# Patient Record
Sex: FEMALE | Race: WHITE | NOT HISPANIC OR LATINO | Employment: FULL TIME | URBAN - METROPOLITAN AREA
[De-identification: names, ages, dates, MRNs, and addresses within clinical notes are randomized per-mention and may not be internally consistent; named-entity substitution may affect disease eponyms.]

---

## 2024-09-24 ENCOUNTER — TELEPHONE (OUTPATIENT)
Dept: HEMATOLOGY/ONCOLOGY | Facility: CLINIC | Age: 44
End: 2024-09-24
Payer: COMMERCIAL

## 2024-09-24 DIAGNOSIS — E04.1 THYROID NODULE: Primary | ICD-10-CM

## 2024-09-24 NOTE — NURSING
Spoke with patient regarding referral from Dr. Ingram. Reviewed appt info and location with her. She is agreeable to be seen by Dr. Jin on [facility] and verbalized understanding. He rbiopsy with Dr. Ingram is scheduled for 9/27/24 at [place].     Oncology Navigation   Intake  Cancer Type: Head and Neck  Type of Referral: External  Date of Referral: 09/24/24  Initial Nurse Navigator Contact: 09/24/24  Referral to Initial Contact Timeline (days): 0  First Appointment Available: 10/01/24     Treatment  Current Status: Staging work-up    Surgical Oncologist: Dr. Teresa Jin (H&N surg onc)                          Acuity      Follow Up  No follow-ups on file.

## 2024-10-01 ENCOUNTER — OFFICE VISIT (OUTPATIENT)
Dept: OTOLARYNGOLOGY | Facility: CLINIC | Age: 44
End: 2024-10-01
Payer: COMMERCIAL

## 2024-10-01 VITALS — HEART RATE: 74 BPM | SYSTOLIC BLOOD PRESSURE: 106 MMHG | DIASTOLIC BLOOD PRESSURE: 72 MMHG

## 2024-10-01 DIAGNOSIS — E04.1 THYROID NODULE: Primary | ICD-10-CM

## 2024-10-01 PROBLEM — R01.1 HEART MURMUR: Status: ACTIVE | Noted: 2021-06-21

## 2024-10-01 PROBLEM — E55.9 VITAMIN D DEFICIENCY: Status: ACTIVE | Noted: 2021-06-21

## 2024-10-01 PROBLEM — E78.1 PURE HYPERTRIGLYCERIDEMIA: Status: ACTIVE | Noted: 2021-06-21

## 2024-10-01 PROCEDURE — 99204 OFFICE O/P NEW MOD 45 MIN: CPT | Mod: S$GLB,,, | Performed by: STUDENT IN AN ORGANIZED HEALTH CARE EDUCATION/TRAINING PROGRAM

## 2024-10-01 PROCEDURE — 99999 PR PBB SHADOW E&M-EST. PATIENT-LVL III: CPT | Mod: PBBFAC,,, | Performed by: STUDENT IN AN ORGANIZED HEALTH CARE EDUCATION/TRAINING PROGRAM

## 2024-10-01 NOTE — PROGRESS NOTES
Note to patients: In accordance with the  Century Cures Act, patients are now granted immediate electronic access to their medical records. This note is primarily intended for communication among medical professionals. As a result, it may incorporate medical terminology, abbreviations, or language that could appear blunt or unfamiliar. If you have questions about this document, we encourage you to discuss it with your physician.      [facility]  Head & Neck [facility]    Patient: Ysabel Lua    : 1980    MRN: [id number]  Primary Care Provider: Almaz Bravo MD  Referring Provider/ENT: [place] Ingram MD   Date of Encounter: 10/01/2024    DIAGNOSIS: multinodular thyroid      CC:   Chief Complaint   Patient presents with    Thyroid Nodule       HPI:   Ysabel Lua is a 44 y.o. female who is being seen today for thyroid nodules. She was evaluated by her PCP who noticed an enlarged thyroid. She was referred to Dr. Ingram, who then referred her here. She has had both an ultrasound and an FNA.    Patient has been overall in her usual state of health. She has had a small decrease in her weight that was unintentional.    Patient has several family members with thyroid cancer. Her , who is with her today. has Graves and so they are familiar with thyroid issues.    Patient lives in [place].    ALLERGIES:  Review of patient's allergies indicates:   Allergen Reactions    Codeine          MEDICATIONS:  No current outpatient medications on file.    PAST MEDICAL HISTORY:  Past Medical History:   Diagnosis Date    Fibroid, uterine 2006    Genetic testing 2017    neg        PAST SURGICAL HISTORY:  Past Surgical History:   Procedure Laterality Date    APPENDECTOMY      MYOMECTOMY  2010        FAMILY HISTORY:  Family History   Problem Relation Name Age of Onset    Colon cancer Maternal Grandfather      Thyroid cancer Maternal Grandfather      Breast cancer Paternal Grandmother       Ovarian cancer Paternal Grandmother          PATIENT GENETIC TEST NEG - 2017    Heart disease Paternal Grandfather      Prostate cancer Paternal Grandfather         SOCIAL HISTORY:  Social History     Socioeconomic History    Marital status:    Tobacco Use    Smoking status: Former    Smokeless tobacco: Never   Substance and Sexual Activity    Alcohol use: Yes     Alcohol/week: 1.0 standard drink of alcohol     Types: 1 Glasses of wine per week    Drug use: No    Sexual activity: Yes     Partners: Male     Birth control/protection: None     Social Drivers of Health     Financial Resource Strain: Unknown (7/27/2021)    [place] [facility]    [place] Resource Strain (CARDIA)     Difficulty of Paying Living Expenses: Patient declined   Food Insecurity: Unknown (7/27/2021)    [place] [facility]    Hunger Vital Sign     Worried About Running Out of Food in the Last Year: Patient declined     Ran Out of Food in the Last Year: Patient declined   Transportation Needs: Unknown (7/27/2021)    [place] [facility]    PRAPARE - Transportation     Lack of Transportation (Medical): Patient declined     Lack of Transportation (Non-Medical): Patient declined   Physical Activity: Unknown (7/27/2021)    [place] [facility]    Exercise Vital Sign     Days of Exercise per Week: Patient declined     Minutes of Exercise per Session: Patient declined   Stress: Unknown (7/27/2021)    [place] [facility]    [employer] - Occupational Stress Questionnaire     Feeling of Stress : Patient declined     See above substance history    REVIEW OF SYSTEMS:   Comprehensive review of systems was discussed with the patient.    Answers submitted by the patient for this visit:  Review of  Symptoms Questionnaire  (Submitted on 9/24/2024)  Fatigue (Tiredness)?: Yes  Unexpected weight loss?: Yes  None of these: Yes  None of these : Yes  None of these: Yes  None of these : Yes  None of these: Yes  None of these: Yes  neck pain: Yes  None of these : Yes  None of these: Yes  None of these : Yes  dizziness: Yes  Light-headedness: Yes  None of these: Yes  None of these: Yes  It is positive only for the above complaints.    PHYSICAL EXAMINATION:  Blood pressure 106/72, pulse 74.    Constitutional: Non-toxic appearing.   Psychiatric: Appropriate mood and affect. Cooperative.  Voice: Non-dysphonic, speaking in full sentences.   Neurologic: Cranial nerves grossly intact, no focal deficits.  Head and face: Salivary glands are not enlarged. Face is symmetric. CN VII strength intact.  Skin: No concerning skin lesions.   Eyes: Vision grossly intact, bilateral extraocular movements intact  Ears: Bilateral pinna, mastoid, external ear canal normal. Hearing intact.   Nose: External nose appears normal.   Lips: No ulcers or lesions  Neck: Palpable left thyroid nodule with skin bruising from FNA  Respiratory: Chest expansion symmetric, no audible stridor or stertor. Breathing is unlabored. No active cough.    DATA REVIEWED:     LABORATORY:  TSH 2.56  T4 1.19    PATHOLOGY:  Thyroid FNA 9/27/24  FINAL DIAGNOSIS:  Thyroid, left lower lobe, nodule (3.6 x 2.9 x 2.7 cm), fine-needle aspirate, cytospins and cellblock:  -Benign nodule, [place] II.     IMAGING:  US 9/9/24  FINDINGS: The right thyroid lobe measures 4.6 x 2.0 x 1.8 cm, and the left thyroid lobe measures 5.9 x 3.0 x 3.1 cm. The isthmus measures 0.43 cm in thickness.    Left thyroid nodule (image 430).  Composition: Mixed cystic and solid (1 point).  Echogenicity: Very hypoechoic (3 points).  Shape: Wider-than-tall (0 points).  Margin: Ill-defined (0 points).  Echogenic foci: None or large comet-tail artifacts (0 points).  Size: 3.6 x 2.7 x 2.9 cm  Change in size  (greater than or equal to 20% increase in at least 2 nodular dimensions, with a minimal increase of 2 mm or greater than or equal to 50% or greater increase in volume): Not applicable.  TI-RADS: TR4: Moderately suspicious - Fine-needle aspiration if greater than or equal to 1.5 cm.    Isthmus nodule.  Composition: Solid or almost completely solid (2 points).  Echogenicity: Hypoechoic (2 points).  Shape: Wider-than-tall (0 points).  Margin: Smooth (0 points).  Echogenic foci: Macrocalcifications (1 point).  Size: 0.9 x 0.8 x 0.4 cm cm  Change in size (greater than or equal to 20% increase in at least 2 nodular dimensions, with a minimal increase of 2 mm or greater than or equal to 50% or greater increase in volume): Not applicable.  TI-RADS: TR4: Moderately suspicious - Fine-needle aspiration if greater than or equal to 1.5 cm; follow if greater than or equal to 1 cm at 1, 2, 3, and 5 years with ultrasound.    Left thyroid 0.5 cm hypoechoic nodule not requiring follow-up in size.    IMPRESSION:  1.  Left thyroid nodule. FNA recommended.  2.  Additional smaller thyroid nodules not requiring follow-up.     RADIOLOGY REVIEWED:  I have independently viewed and agree with the above images and reports which demonstrate large thyroid nodules on the left and small isthmus nodule.    ASSESSMENT AND PLAN:  1. Thyroid nodule       Ysabel Lua is a 44 y.o. female with large left thyroid nodule, with biopsy consistent with [place] II, which is benign.    - We discussed how common thyroid nodules are in the general population. However, I explained that the vast majority of thyroid nodules are not malignant and that there are certain steps that we have to take to evaluate thyroid nodules for malignancy. She has had an ultrasound as well as an ultrasound-guided FNA. Because she is euthyroid, there is no need for a radioactive thyroid scan.    I then discussed with the patient the potential outcomes of the fine-needle aspiration:  "malignant, suspicious for malignancy, follicular lesion/suspicious for follicular lesion, atypia of uncertain significance/FLUS, or benign (in addition to nondiagnostic).  Recommendations for treatment are predicated on this result.  In general benign lesions can be followed with intermittent ultrasound.  My recommendation for malignant or suspicious lesions is total thyroidectomy, potentially with radioactive iodine remnant ablation in the setting of malignancy.  Follicular lesions merit thyroid lobectomy or total thyroidectomy, depending on the specific clinical picture or further molecular studies. In lower risk cases or in patients who are disinclined to pursue surgery or are poor surgical candidates, interval ultrasound is also reasonable. Similarly, either repeat ultrasound-guided needle biopsy or surgery is a reasonable course of action for the indeterminate biopsy result, again depending on the specific clinical picture.     The patient is understandably anxious about the lesion given her family history of cancer, but with a large cystic component and a benign biopsy, it is very reasonable to reassess the nodule with an ultrasound in 6 months. This will give her time to think about her options. If the nodule grows or she has symptoms from the size, we can already readdress surgery regardless of biopsy results. If it changes in appearance or becomes more concerning on interval ultrasound, we will potentially rebiopsy or remove.    In case she does want to proceed with surgery in the future, we discussed what surgery would look like. The procedure (unique or total thyroidectomy) takes anywhere from 1-4 hours and is done as an "outpatient." If we remove only half of the thyroid, many people go home the same day, but for the whole thyroid, most have an overnight stay. Sometimes I place a drain. You will start on thyroid hormone replacement right after surgery only if the whole thyroid is removed, although having " half removing increases the risk of needing replacement in the future.     The risks of thyroidectomy include, but are not limited to, infection, bleeding, scarring, failure to achieve the diagnosis, no evidence of cancer, recurrence, collection of blood or tissue fluid requiring drainage, injury to the recurrent laryngeal nerve with resultant temporary or permanent hoarseness (1% permanent risk with up to 10% temporary risk, greater in revision operations), injury to the superior laryngeal nerve with resultant loss of the upper register for singing or challenges with yelling, temporary or permanent hypocalcemia related to injury or devascularization of the parathyroid glands (less than 5% permanent, up to 30-60% when paratracheal dissection is accomplished, again greater in revision operations), and the need for additional procedures or therapies.       For now, we will observe. She will let me know if she changes her mind.    Orders Placed This Encounter    US Thyroid        Patient encouraged to call with any questions, concerns, or new or worsening symptoms.     Follow up 6 months after new ultrasound

## 2024-10-02 ENCOUNTER — DOCUMENTATION ONLY (OUTPATIENT)
Dept: HEMATOLOGY/ONCOLOGY | Facility: CLINIC | Age: 44
End: 2024-10-02
Payer: COMMERCIAL

## 2024-10-02 NOTE — NURSING
Chart reviewed after f/u with Dr. Teresa Jin. Plan is for surveillance of her thyroid with US in 6 months. No cancer navigation needs. Will sign off

## 2024-11-04 ENCOUNTER — TELEPHONE (OUTPATIENT)
Dept: HEMATOLOGY/ONCOLOGY | Facility: CLINIC | Age: 44
End: 2024-11-04
Payer: COMMERCIAL

## 2024-11-04 NOTE — TELEPHONE ENCOUNTER
Pt states that she has decided that she wants surgery with Dr Jin in Dec.  Pt states that she does not need a clinic appt with Dr Jin to discuss surgery.  Will notify Dr Jin and call back pt with surgery schedule.  Pt satisfied with plan.

## 2024-11-07 ENCOUNTER — TELEPHONE (OUTPATIENT)
Dept: HEMATOLOGY/ONCOLOGY | Facility: CLINIC | Age: 44
End: 2024-11-07
Payer: COMMERCIAL

## 2024-11-07 DIAGNOSIS — E04.9 THYROID GOITER: ICD-10-CM

## 2024-11-07 DIAGNOSIS — E04.1 THYROID NODULE: Primary | ICD-10-CM

## 2024-11-07 RX ORDER — LIDOCAINE HYDROCHLORIDE 10 MG/ML
1 INJECTION, SOLUTION EPIDURAL; INFILTRATION; INTRACAUDAL; PERINEURAL ONCE
OUTPATIENT
Start: 2024-11-07 | End: 2024-11-07

## 2024-11-07 RX ORDER — SODIUM CHLORIDE 0.9 % (FLUSH) 0.9 %
10 SYRINGE (ML) INJECTION EVERY 6 HOURS PRN
OUTPATIENT
Start: 2024-11-07

## 2024-11-07 NOTE — TELEPHONE ENCOUNTER
Pt scheduled for surgery with Dr Jin 12/5/24 at [facility].  Called and reviewed pt's PAT, surgery and post op dates, times and locations.  Reviewed pre op instructions and all questions answered to pt's satisfaction.

## 2024-11-18 ENCOUNTER — TELEPHONE (OUTPATIENT)
Dept: HEMATOLOGY/ONCOLOGY | Facility: CLINIC | Age: 44
End: 2024-11-18
Payer: COMMERCIAL

## 2024-11-18 NOTE — TELEPHONE ENCOUNTER
All pt's questions answered to pt's satisfaction.----- Message from [place] sent at 11/18/2024  1:59 PM CST -----  Type: Needs Medical Advice  Who Called:  Patient   Symptoms (please be specific):    How long has patient had these symptoms:    Pharmacy name and phone #:    Best Call Back Number: [phone]  Additional Information: Patient is requesting a call back from [place].

## 2024-11-29 ENCOUNTER — TELEPHONE (OUTPATIENT)
Dept: HEMATOLOGY/ONCOLOGY | Facility: CLINIC | Age: 44
End: 2024-11-29
Payer: COMMERCIAL

## 2025-04-07 ENCOUNTER — HOSPITAL ENCOUNTER (OUTPATIENT)
Dept: RADIOLOGY | Facility: HOSPITAL | Age: 45
Discharge: HOME | End: 2025-04-07
Attending: STUDENT IN AN ORGANIZED HEALTH CARE EDUCATION/TRAINING PROGRAM
Payer: COMMERCIAL

## 2025-04-07 ENCOUNTER — OFFICE VISIT (OUTPATIENT)
Dept: HEMATOLOGY/ONCOLOGY | Facility: CLINIC | Age: 45
End: 2025-04-07
Payer: COMMERCIAL

## 2025-04-07 VITALS
TEMPERATURE: 97 F | HEIGHT: 65 IN | DIASTOLIC BLOOD PRESSURE: 67 MMHG | WEIGHT: 158.06 LBS | HEART RATE: 70 BPM | SYSTOLIC BLOOD PRESSURE: 113 MMHG | RESPIRATION RATE: 16 BRPM | BODY MASS INDEX: 26.33 KG/M2 | OXYGEN SATURATION: 97 %

## 2025-04-07 DIAGNOSIS — E04.1 THYROID NODULE: Primary | ICD-10-CM

## 2025-04-07 DIAGNOSIS — E04.1 THYROID NODULE: ICD-10-CM

## 2025-04-07 PROCEDURE — 99999 PR PBB SHADOW E&M-EST. PATIENT-LVL III: CPT | Mod: PBBFAC,,, | Performed by: NURSE PRACTITIONER

## 2025-04-07 PROCEDURE — 76536 US EXAM OF HEAD AND NECK: CPT | Mod: 26,,, | Performed by: RADIOLOGY

## 2025-04-07 PROCEDURE — 76536 US EXAM OF HEAD AND NECK: CPT | Mod: TC,PO

## 2025-04-07 PROCEDURE — 99214 OFFICE O/P EST MOD 30 MIN: CPT | Mod: S$GLB,,, | Performed by: NURSE PRACTITIONER

## 2025-04-07 NOTE — PROGRESS NOTES
Note to patients: In accordance with the  Century Cures Act, patients are now granted immediate electronic access to their medical records. This note is primarily intended for communication among medical professionals. As a result, it may incorporate medical terminology, abbreviations, or language that could appear blunt or unfamiliar. If you have questions about this document, we encourage you to discuss it with your physician.        [facility]  [facility] & Ne[facility]     Patient: Ysabel Lua                  : 1980                          MRN: [id number]  Primary Care Provider: Almaz Bravo MD  Referring Provider/ENT: [place] Ingram MD   Date of Encounter: 10/01/2024     DIAGNOSIS: multinodular thyroid     CC:       Chief Complaint   Patient presents with    Thyroid Nodule         HPI:   Ysabel Lua is a 44 y.o. female who is being seen today for thyroid nodules. She was evaluated by her PCP who noticed an enlarged thyroid. She was referred to Dr. Ingram, who then referred her here. She has had both an ultrasound and an FNA.     Patient has been overall in her usual state of health. She has had a small decrease in her weight that was unintentional.     Patient has several family members with thyroid cancer. Her , who is with her today. has Graves and so they are familiar with thyroid issues.    Interval HPI: She has no changes since her last visit. She denies any growth in the nodule in her neck. She denies any fever, unintentional weight loss, change in voice, swallowing concerns.     Patient lives in [place].     ALLERGIES:       Review of patient's allergies indicates:   Allergen Reactions    Codeine                      MEDICATIONS:  Current Medications   No current outpatient medications on file.        PAST MEDICAL HISTORY:       Past Medical History:   Diagnosis Date    Fibroid, uterine 2006    Genetic testing 2017     neg                    PAST  SURGICAL HISTORY:        Past Surgical History:   Procedure Laterality Date    APPENDECTOMY        MYOMECTOMY   2010                    FAMILY HISTORY:         Family History   Problem Relation Name Age of Onset    Colon cancer Maternal Grandfather        Thyroid cancer Maternal Grandfather        Breast cancer Paternal Grandmother        Ovarian cancer Paternal Grandmother             PATIENT GENETIC TEST NEG - 2017    Heart disease Paternal Grandfather        Prostate cancer Paternal Grandfather             SOCIAL HISTORY:  Social History            Socioeconomic History    Marital status:    Tobacco Use    Smoking status: Former    Smokeless tobacco: Never   Substance and Sexual Activity    Alcohol use: Yes       Alcohol/week: 1.0 standard drink of alcohol       Types: 1 Glasses of wine per week    Drug use: No    Sexual activity: Yes       Partners: Male       Birth control/protection: None      Social Drivers of Health           Financial Resource Strain: Unknown (7/27/2021)     [place] [facility] [place]     Overall Financial Resource Strain (CARDIA)      Difficulty of Paying Living Expenses: Patient declined   Food Insecurity: Unknown (7/27/2021)     Received from [facility]     Hunger Vital Sign      Worried About Running Out of Food in the Last Year: Patient declined      Ran Out of Food in the Last Year: Patient declined   Transportation Needs: Unknown (7/27/2021)     Received from [facility]     PRAPARE - Transportation      Lack of Transportation (Medical): Patient declined      Lack of Transportation (Non-Medical): Patient declined   Physical Activity: Unknown (7/27/2021)     Received from [facility]     Exercise Vital Sign      Days of Exercise per Week: Patient declined      Minutes of Exercise per Session: Patient declined   Stress:  Unknown (7/27/2021)     [facility]     [employer] - Occupational Stress Questionnaire      Feeling of Stress : Patient declined      See above substance history     REVIEW OF SYSTEMS:   Comprehensive review of systems was discussed with the patient.    Answers submitted by the patient for this visit:  Review of Symptoms Questionnaire  (Submitted on 9/24/2024)  Fatigue (Tiredness)?: Yes  Unexpected weight loss?: Yes  None of these: Yes  None of these : Yes  None of these: Yes  None of these : Yes  None of these: Yes  None of these: Yes  neck pain: Yes  None of these : Yes  None of these: Yes  None of these : Yes  dizziness: Yes  Light-headedness: Yes  None of these: Yes  None of these: Yes  It is positive only for the above complaints.     PHYSICAL EXAMINATION:  Blood pressure 106/72, pulse 74.     Constitutional: Non-toxic appearing.   Psychiatric: Appropriate mood and affect. Cooperative.  Voice: Non-dysphonic, speaking in full sentences.   Neurologic: Cranial nerves grossly intact, no focal deficits.  Head and face: Salivary glands are not enlarged. Face is symmetric. CN VII strength intact.  Skin: No concerning skin lesions.   Eyes: Vision grossly intact, bilateral extraocular movements intact  Ears: Bilateral pinna, mastoid, external ear canal normal. Hearing intact.   Nose: External nose appears normal.   Lips: No ulcers or lesions  Neck: Palpable left thyroid nodule   Respiratory: Chest expansion symmetric, no audible stridor or stertor. Breathing is unlabored. No active cough.     DATA REVIEWED:      LABORATORY:  TSH 2.56  T4 1.19     PATHOLOGY:  Thyroid FNA 9/27/24  FINAL DIAGNOSIS:  Thyroid, left lower lobe, nodule (3.6 x 2.9 x 2.7 cm), fine-needle aspirate, cytospins and cellblock:  -Benign nodule, [place] II.      IMAGING:  US 9/9/24  FINDINGS: The right thyroid lobe measures 4.6 x 2.0 x 1.8 cm, and the left thyroid lobe  measures 5.9 x 3.0 x 3.1 cm. The isthmus measures 0.43 cm in thickness.    Left thyroid nodule (image 430).  Composition: Mixed cystic and solid (1 point).  Echogenicity: Very hypoechoic (3 points).  Shape: Wider-than-tall (0 points).  Margin: Ill-defined (0 points).  Echogenic foci: None or large comet-tail artifacts (0 points).  Size: 3.6 x 2.7 x 2.9 cm  Change in size (greater than or equal to 20% increase in at least 2 nodular dimensions, with a minimal increase of 2 mm or greater than or equal to 50% or greater increase in volume): Not applicable.  TI-RADS: TR4: Moderately suspicious - Fine-needle aspiration if greater than or equal to 1.5 cm.    Isthmus nodule.  Composition: Solid or almost completely solid (2 points).  Echogenicity: Hypoechoic (2 points).  Shape: Wider-than-tall (0 points).  Margin: Smooth (0 points).  Echogenic foci: Macrocalcifications (1 point).  Size: 0.9 x 0.8 x 0.4 cm cm  Change in size (greater than or equal to 20% increase in at least 2 nodular dimensions, with a minimal increase of 2 mm or greater than or equal to 50% or greater increase in volume): Not applicable.  TI-RADS: TR4: Moderately suspicious - Fine-needle aspiration if greater than or equal to 1.5 cm; follow if greater than or equal to 1 cm at 1, 2, 3, and 5 years with ultrasound.    Left thyroid 0.5 cm hypoechoic nodule not requiring follow-up in size.    IMPRESSION:  1.  Left thyroid nodule. FNA recommended.  2.  Additional smaller thyroid nodules not requiring follow-up.     US Thyroid: 4/7/25  1. Interval increase in size of hypoechoic nodule with central calcifications in the right-sided the isthmus.  TI RADS grade of 4.  Considering the change in size, FNA recommended.  2.  Otherwise, stable multinodular goiter.     RADIOLOGY REVIEWED:  I have independently viewed and agree with the above images and reports which demonstrate large thyroid nodules on the left and small isthmus nodule.     ASSESSMENT AND PLAN:  1.  Thyroid nodule       Ysabel Lua is a 44 y.o. female with large left thyroid nodule, with biopsy consistent with [place] II, which is benign.     - Ultrasound is still pending  - If it changes in appearance or becomes more concerning on interval ultrasound, she would opt for removal vs rebiopsy   - Will call to discuss US once resulted    We discussed how common thyroid nodules are in the general population. However, I explained that the vast majority of thyroid nodules are not malignant and that there are certain steps that we have to take to evaluate thyroid nodules for malignancy. She has had an ultrasound, but She now needs an ultrasound-guided FNA, which we have set up through radiology. Because She is euthyroid, there is no need for a radioactive thyroid scan.    I then discussed with the patient the potential outcomes of the fine-needle aspiration: malignant, suspicious for malignancy, follicular lesion/suspicious for follicular lesion, atypia of uncertain significance/FLUS, or benign (in addition to nondiagnostic).  Recommendations for treatment are predicated on this result.  In general benign lesions can be followed with intermittent ultrasound.  My recommendation for malignant or suspicious lesions is total thyroidectomy, potentially with radioactive iodine remnant ablation in the setting of malignancy.  Follicular lesions merit thyroid lobectomy or total thyroidectomy, depending on the specific clinical picture or further molecular studies. In lower risk cases or in patients who are disinclined to pursue surgery or are poor surgical candidates, interval ultrasound is also reasonable. Similarly, either repeat ultrasound-guided needle biopsy or surgery is a reasonable course of action for the indeterminate biopsy result, again depending on the specific clinical picture.  Time was allowed for questions, and all questions were answered to the patient's apparent satisfaction.        Patient encouraged to  call with any questions, concerns, or new or worsening symptoms. 30 minutes spent in direct patient care, chart review and documentation      Follow up to be determined once we have a plan     Will discuss case with Dr. Jin     Addendum: ultrasound reviewed with patient. Will dicuss with Dr. Jin on Thursday to see what surgery would entail (partial/total etc) before deciding on next steps.   4/10/25: Discussed case with Dr. Jin as well as US review. If she does want to proceed with surgery would removed left and isthmus. She verbalized understanding and will call back on Monday. I also offered her an appointment for Monday to discuss surgery with Dr. Jin but she declined at this time,

## 2025-04-17 ENCOUNTER — PATIENT MESSAGE (OUTPATIENT)
Dept: HEMATOLOGY/ONCOLOGY | Facility: CLINIC | Age: 45
End: 2025-04-17
Payer: COMMERCIAL

## 2025-04-21 ENCOUNTER — OFFICE VISIT (OUTPATIENT)
Dept: HEMATOLOGY/ONCOLOGY | Facility: CLINIC | Age: 45
End: 2025-04-21
Payer: COMMERCIAL

## 2025-04-21 VITALS
BODY MASS INDEX: 26 KG/M2 | WEIGHT: 156.06 LBS | OXYGEN SATURATION: 100 % | SYSTOLIC BLOOD PRESSURE: 111 MMHG | DIASTOLIC BLOOD PRESSURE: 71 MMHG | RESPIRATION RATE: 16 BRPM | HEIGHT: 65 IN | HEART RATE: 68 BPM | TEMPERATURE: 98 F

## 2025-04-21 DIAGNOSIS — E04.1 THYROID NODULE: Primary | ICD-10-CM

## 2025-04-21 PROCEDURE — 99999 PR PBB SHADOW E&M-EST. PATIENT-LVL IV: CPT | Mod: PBBFAC,,, | Performed by: STUDENT IN AN ORGANIZED HEALTH CARE EDUCATION/TRAINING PROGRAM

## 2025-04-21 PROCEDURE — 99214 OFFICE O/P EST MOD 30 MIN: CPT | Mod: S$GLB,,, | Performed by: STUDENT IN AN ORGANIZED HEALTH CARE EDUCATION/TRAINING PROGRAM

## 2025-04-21 RX ORDER — SODIUM CHLORIDE 0.9 % (FLUSH) 0.9 %
10 SYRINGE (ML) INJECTION EVERY 6 HOURS PRN
OUTPATIENT
Start: 2025-04-21

## 2025-04-21 RX ORDER — LIDOCAINE HYDROCHLORIDE 10 MG/ML
1 INJECTION, SOLUTION EPIDURAL; INFILTRATION; INTRACAUDAL; PERINEURAL ONCE
OUTPATIENT
Start: 2025-04-21 | End: 2025-04-21

## 2025-04-21 NOTE — PROGRESS NOTES
Note to patients: In accordance with the  Century Cures Act, patients are now granted immediate electronic access to their medical records. This note is primarily intended for communication among medical professionals. As a result, it may incorporate medical terminology, abbreviations, or language that could appear blunt or unfamiliar. If you have questions about this document, we encourage you to discuss it with your physician.        [facility]     Patient: Ysabel Lua                  : 1980                          MRN: [id number]  Primary Care Provider: Almaz Bravo MD  Referring Provider/ENT: [place] Ingram MD   Date of Encounter: 10/01/2024     DIAGNOSIS: multinodular thyroid     CC:       Chief Complaint   Patient presents with    Thyroid Nodule         HPI:   Ysabel Lua is a 44 y.o. female who is being seen today for thyroid nodules. She was evaluated by her PCP who noticed an enlarged thyroid. She was referred to Dr. Ingram, who then referred her here. She has had both an ultrasound and an FNA.     Patient has been overall in her usual state of health. She has had a small decrease in her weight that was unintentional.     Patient has several family members with thyroid cancer. Her , who is with her today. has Graves and so they are familiar with thyroid issues.    25: She has no changes since her last visit. She denies any growth in the nodule in her neck. She denies any fever, unintentional weight loss, change in voice, swallowing concerns.    25: Patient here to discuss surgery.     Patient lives in [place].     ALLERGIES:       Review of patient's allergies indicates:   Allergen Reactions    Codeine                      MEDICATIONS:  Current Medications   No current outpatient medications on file.        PAST MEDICAL HISTORY:       Past Medical History:   Diagnosis Date    Fibroid, uterine 2006    Genetic testing 2017      neg                    PAST SURGICAL HISTORY:        Past Surgical History:   Procedure Laterality Date    APPENDECTOMY        MYOMECTOMY   2010                    FAMILY HISTORY:         Family History   Problem Relation Name Age of Onset    Colon cancer Maternal Grandfather        Thyroid cancer Maternal Grandfather        Breast cancer Paternal Grandmother        Ovarian cancer Paternal Grandmother             PATIENT GENETIC TEST NEG - 2017    Heart disease Paternal Grandfather        Prostate cancer Paternal Grandfather             SOCIAL HISTORY:  Social History            Socioeconomic History    Marital status:    Tobacco Use    Smoking status: Former    Smokeless tobacco: Never   Substance and Sexual Activity    Alcohol use: Yes       Alcohol/week: 1.0 standard drink of alcohol       Types: 1 Glasses of wine per week    Drug use: No    Sexual activity: Yes       Partners: Male       Birth control/protection: None      Social Drivers of Health           Financial Resource Strain: Unknown (7/27/2021)     [place] [facility] [place] Financial Resource Strain (CARDIA)      Difficulty of Paying Living Expenses: Patient declined   Food Insecurity: Unknown (7/27/2021)     [place] [facility]     Hunger Vital Sign      Worried About Running Out of Food in the Last Year: Patient declined      Ran Out of Food in the Last Year: Patient declined   Transportation Needs: Unknown (7/27/2021)     [place] [facility]     PRAPARE - Transportation      Lack of Transportation (Medical): Patient declined      Lack of Transportation (Non-Medical): Patient declined   Physical Activity: Unknown (7/27/2021)     [place] [facility] [place]     Exercise Vital Sign      Days of Exercise per Week: Patient declined      Minutes of Exercise per Session:  Patient declined   Stress: Unknown (7/27/2021)     [facility]     [employer] [place] [employer] - Occupational Stress Questionnaire      Feeling of Stress : Patient declined      See above substance history       PHYSICAL EXAMINATION:     Constitutional: Non-toxic appearing.   Psychiatric: Appropriate mood and affect. Cooperative.  Voice: Non-dysphonic, speaking in full sentences.   Neurologic: Cranial nerves grossly intact, no focal deficits.  Head and face: Salivary glands are not enlarged. Face is symmetric. CN VII strength intact.  Skin: No concerning skin lesions.   Eyes: Vision grossly intact, bilateral extraocular movements intact  Ears: Bilateral pinna, mastoid, external ear canal normal. Hearing intact.   Nose: External nose appears normal.   Lips: No ulcers or lesions  Neck: Palpable left thyroid nodule   Respiratory: Chest expansion symmetric, no audible stridor or stertor. Breathing is unlabored. No active cough.     DATA REVIEWED:      LABORATORY:  TSH 2.56  T4 1.19     PATHOLOGY:  Thyroid FNA 9/27/24  FINAL DIAGNOSIS:  Thyroid, left lower lobe, nodule (3.6 x 2.9 x 2.7 cm), fine-needle aspirate, cytospins and cellblock:  -Benign nodule, [place] II.      IMAGING:  US 9/9/24  FINDINGS: The right thyroid lobe measures 4.6 x 2.0 x 1.8 cm, and the left thyroid lobe measures 5.9 x 3.0 x 3.1 cm. The isthmus measures 0.43 cm in thickness.    Left thyroid nodule (image 430).  Composition: Mixed cystic and solid (1 point).  Echogenicity: Very hypoechoic (3 points).  Shape: Wider-than-tall (0 points).  Margin: Ill-defined (0 points).  Echogenic foci: None or large comet-tail artifacts (0 points).  Size: 3.6 x 2.7 x 2.9 cm  Change in size (greater than or equal to 20% increase in at least 2 nodular dimensions, with a minimal increase of 2 mm or greater than or equal to 50% or greater increase in volume): Not applicable.  TI-RADS: TR4: Moderately  "suspicious - Fine-needle aspiration if greater than or equal to 1.5 cm.    Isthmus nodule.  Composition: Solid or almost completely solid (2 points).  Echogenicity: Hypoechoic (2 points).  Shape: Wider-than-tall (0 points).  Margin: Smooth (0 points).  Echogenic foci: Macrocalcifications (1 point).  Size: 0.9 x 0.8 x 0.4 cm cm  Change in size (greater than or equal to 20% increase in at least 2 nodular dimensions, with a minimal increase of 2 mm or greater than or equal to 50% or greater increase in volume): Not applicable.  TI-RADS: TR4: Moderately suspicious - Fine-needle aspiration if greater than or equal to 1.5 cm; follow if greater than or equal to 1 cm at 1, 2, 3, and 5 years with ultrasound.    Left thyroid 0.5 cm hypoechoic nodule not requiring follow-up in size.    IMPRESSION:  1.  Left thyroid nodule. FNA recommended.  2.  Additional smaller thyroid nodules not requiring follow-up.     US Thyroid: 4/7/25  1. Interval increase in size of hypoechoic nodule with central calcifications in the right-sided the isthmus.  TI RADS grade of 4.  Considering the change in size, FNA recommended.  2.  Otherwise, stable multinodular goiter.     RADIOLOGY REVIEWED:  I have independently viewed and agree with the above images and reports which demonstrate large thyroid nodules on the left and small isthmus nodule.     ASSESSMENT AND PLAN:  1. Thyroid nodule       Ysabel Lua is a 44 y.o. female with large left thyroid nodule as well as additional nodule in the thyroid isthmus.  - Patient would like to proceed with surgical excision given the growth of the nodules. Even though these are very likely benign, close monitoring is causing undue distress.  - We will plan on removing the left thyroid with the large nodule as well as the isthmus with the TIRADS 4 nodule  - The procedure takes anywhere from 1-4 hours and is done as an "outpatient" with an overnight stay. Sometimes I place a drain. There are some situations where " you can go home the same day, but those are honestly rare. After surgery, all that we ask is that you avoid straining, including bending over to pick something up, for 10 days.   -The risks of thyroidectomy include, but are not limited to, infection, bleeding, scarring, failure to achieve the diagnosis, no evidence of cancer, recurrence, collection of blood or tissue fluid requiring drainage, injury to the recurrent laryngeal nerve with resultant temporary or permanent hoarseness (1% permanent risk with up to 10% temporary risk, greater in revision operations), injury to the superior laryngeal nerve with resultant loss of the upper register for singing or challenges with yelling, temporary or permanent hypocalcemia related to injury or devascularization of the parathyroid glands (less than 5% permanent, up to 30-60% when paratracheal dissection is accomplished, again greater in revision operations), and the need for additional procedures or therapies.    - We will tentatively plan for surgery the first week of May    Orders Placed This Encounter    Case Request Operating Room: THYROIDECTOMY, SUBTOTAL        Patient encouraged to reach out for further questions as needed prior to surgery

## 2025-05-07 PROBLEM — E04.1 THYROID NODULE: Status: ACTIVE | Noted: 2025-05-07

## 2025-05-12 ENCOUNTER — RESULTS FOLLOW-UP (OUTPATIENT)
Dept: HEMATOLOGY/ONCOLOGY | Facility: CLINIC | Age: 45
End: 2025-05-12

## 2025-05-12 ENCOUNTER — LAB VISIT (OUTPATIENT)
Dept: LAB | Facility: HOSPITAL | Age: 45
End: 2025-05-12
Attending: OBSTETRICS & GYNECOLOGY
Payer: COMMERCIAL

## 2025-05-12 ENCOUNTER — OFFICE VISIT (OUTPATIENT)
Dept: HEMATOLOGY/ONCOLOGY | Facility: CLINIC | Age: 45
End: 2025-05-12
Payer: COMMERCIAL

## 2025-05-12 VITALS
HEART RATE: 76 BPM | RESPIRATION RATE: 16 BRPM | OXYGEN SATURATION: 99 % | HEIGHT: 65 IN | BODY MASS INDEX: 25.13 KG/M2 | SYSTOLIC BLOOD PRESSURE: 106 MMHG | TEMPERATURE: 98 F | WEIGHT: 150.81 LBS | DIASTOLIC BLOOD PRESSURE: 77 MMHG

## 2025-05-12 DIAGNOSIS — E83.51 HYPOCALCEMIA: ICD-10-CM

## 2025-05-12 DIAGNOSIS — C73 THYROID CANCER: Primary | ICD-10-CM

## 2025-05-12 LAB — CALCIUM SERPL-MCNC: 10 MG/DL (ref 8.7–10.5)

## 2025-05-12 PROCEDURE — 99024 POSTOP FOLLOW-UP VISIT: CPT | Mod: S$GLB,,, | Performed by: NURSE PRACTITIONER

## 2025-05-12 PROCEDURE — 36415 COLL VENOUS BLD VENIPUNCTURE: CPT | Mod: PN

## 2025-05-12 PROCEDURE — 99999 PR PBB SHADOW E&M-EST. PATIENT-LVL IV: CPT | Mod: PBBFAC,,, | Performed by: NURSE PRACTITIONER

## 2025-05-12 PROCEDURE — 82310 ASSAY OF CALCIUM: CPT | Mod: PN

## 2025-05-12 NOTE — PROGRESS NOTES
Note to patients: In accordance with the  Century Cures Act, patients are now granted immediate electronic access to their medical records. This note is primarily intended for communication among medical professionals. As a result, it may incorporate medical terminology, abbreviations, or language that could appear blunt or unfamiliar. If you have questions about this document, we encourage you to discuss it with your physician.        [facility]  [facility]     Patient: Ysabel Lua                  : 1980                          MRN: [id number]  Primary Care Provider: Almaz Bravo MD  Referring Provider/ENT: [place] Ingram MD   Date of Encounter: 10/01/2024     DIAGNOSIS: multinodular thyroid     CC:       Chief Complaint   Patient presents with    Thyroid Nodule         HPI:   Ysabel Lua is a 44 y.o. female who is being seen today for thyroid nodules. She was evaluated by her PCP who noticed an enlarged thyroid. She was referred to Dr. Ingram, who then referred her here. She has had both an ultrasound and an FNA.     Patient has been overall in her usual state of health. She has had a small decrease in her weight that was unintentional.     Patient has several family members with thyroid cancer. Her , who is with her today. has Graves and so they are familiar with thyroid issues.    25: She has no changes since her last visit. She denies any growth in the nodule in her neck. She denies any fever, unintentional weight loss, change in voice, swallowing concerns.    25: Patient here to discuss surgery.    25:  She has had around 20cc in the last 24 hours. She is taking her tums 1000mg TID. She is taking her levothyroxine daily on an empty stomach. Overall she is doing well. She denies any pain today. She was having some pain in her perineum last night and thought she may have a hemorrhoid. She has not had any pain today.      Patient lives  in [place].    TREATMENT:   THYROIDECTOMY - total 5/7/25     ALLERGIES:       Review of patient's allergies indicates:   Allergen Reactions    Codeine                      MEDICATIONS:  Current Medications   No current outpatient medications on file.        PAST MEDICAL HISTORY:       Past Medical History:   Diagnosis Date    Fibroid, uterine 2006    Genetic testing 2017     neg                    PAST SURGICAL HISTORY:        Past Surgical History:   Procedure Laterality Date    APPENDECTOMY        MYOMECTOMY   2010                    FAMILY HISTORY:         Family History   Problem Relation Name Age of Onset    Colon cancer Maternal Grandfather        Thyroid cancer Maternal Grandfather        Breast cancer Paternal Grandmother        Ovarian cancer Paternal Grandmother             PATIENT GENETIC TEST NEG - 2017    Heart disease Paternal Grandfather        Prostate cancer Paternal Grandfather             SOCIAL HISTORY:  Social History            Socioeconomic History    Marital status:    Tobacco Use    Smoking status: Former    Smokeless tobacco: Never   Substance and Sexual Activity    Alcohol use: Yes       Alcohol/week: 1.0 standard drink of alcohol       Types: 1 Glasses of wine per week    Drug use: No    Sexual activity: Yes       Partners: Male       Birth control/protection: None      Social Drivers of Health           Financial Resource Strain: Unknown (7/27/2021)     [place] [facility] [place]     Overall Financial Resource Strain (CARDIA)      Difficulty of Paying Living Expenses: Patient declined   Food Insecurity: Unknown (7/27/2021)     Received from [facility] [place]     Hunger Vital Sign      Worried About Running Out of Food in the Last Year: Patient declined      Ran Out of Food in the Last Year: Patient declined   Transportation Needs: Unknown (7/27/2021)     [facility]     PRAPARE - Transportation      Lack of Transportation (Medical): Patient declined      Lack of Transportation (Non-Medical): Patient declined   Physical Activity: Unknown (7/27/2021)     [place] [facility] [place]     Exercise Vital Sign      Days of Exercise per Week: Patient declined      Minutes of Exercise per Session: Patient declined   Stress: Unknown (7/27/2021)     Received from [facility] [place]     [place] [employer] - Occupational Stress Questionnaire      Feeling of Stress : Patient declined      See above substance history       PHYSICAL EXAMINATION:     Constitutional: Non-toxic appearing.   Psychiatric: Appropriate mood and affect. Cooperative.  Voice: Non-dysphonic, speaking in full sentences.   Neurologic: Cranial nerves grossly intact, no focal deficits.  Head and face: Salivary glands are not enlarged. Face is symmetric. CN VII strength intact.  Skin: Incision c/d/I  Eyes: Vision grossly intact, bilateral extraocular movements intact  Ears: Bilateral pinna, mastoid, external ear canal normal. Hearing intact.   Nose: External nose appears normal.   Lips: No ulcers or lesions  Neck:   Respiratory: Chest expansion symmetric, no audible stridor or stertor. Breathing is unlabored. No active cough.     DATA REVIEWED:      LABORATORY:  TSH 2.56  T4 1.19     PATHOLOGY:  Thyroid FNA 9/27/24  FINAL DIAGNOSIS:  Thyroid, left lower lobe, nodule (3.6 x 2.9 x 2.7 cm), fine-needle aspirate, cytospins and cellblock:  -Benign nodule, [place] II.      IMAGING:  US 9/9/24  FINDINGS: The right thyroid lobe measures 4.6 x 2.0 x 1.8 cm, and the left thyroid lobe measures 5.9 x 3.0 x 3.1 cm. The isthmus measures 0.43 cm in thickness.    Left thyroid nodule (image 430).  Composition: Mixed cystic and solid (1 point).  Echogenicity: Very hypoechoic (3 points).  Shape: Wider-than-tall (0 points).  Margin:  Ill-defined (0 points).  Echogenic foci: None or large comet-tail artifacts (0 points).  Size: 3.6 x 2.7 x 2.9 cm  Change in size (greater than or equal to 20% increase in at least 2 nodular dimensions, with a minimal increase of 2 mm or greater than or equal to 50% or greater increase in volume): Not applicable.  TI-RADS: TR4: Moderately suspicious - Fine-needle aspiration if greater than or equal to 1.5 cm.    Isthmus nodule.  Composition: Solid or almost completely solid (2 points).  Echogenicity: Hypoechoic (2 points).  Shape: Wider-than-tall (0 points).  Margin: Smooth (0 points).  Echogenic foci: Macrocalcifications (1 point).  Size: 0.9 x 0.8 x 0.4 cm cm  Change in size (greater than or equal to 20% increase in at least 2 nodular dimensions, with a minimal increase of 2 mm or greater than or equal to 50% or greater increase in volume): Not applicable.  TI-RADS: TR4: Moderately suspicious - Fine-needle aspiration if greater than or equal to 1.5 cm; follow if greater than or equal to 1 cm at 1, 2, 3, and 5 years with ultrasound.    Left thyroid 0.5 cm hypoechoic nodule not requiring follow-up in size.    IMPRESSION:  1.  Left thyroid nodule. FNA recommended.  2.  Additional smaller thyroid nodules not requiring follow-up.     US Thyroid: 4/7/25  1. Interval increase in size of hypoechoic nodule with central calcifications in the right-sided the isthmus.  TI RADS grade of 4.  Considering the change in size, FNA recommended.  2.  Otherwise, stable multinodular goiter.     RADIOLOGY REVIEWED:  I have independently viewed and agree with the above images and reports which demonstrate large thyroid nodules on the left and small isthmus nodule.     ASSESSMENT AND PLAN:  1. Thyroid nodule       Ysabel Lua is a 44 y.o. female with large left thyroid nodule as well as additional nodule in the thyroid isthmus.  - Path still pending; prelim on frozen did come back as papillary thyroid cancer  - Will place referral to  endo   - Drain removed today  - Can try Anusol, sitz bath, stool softener and f/u with GYN or PCP if does not improve    Orders Placed This Encounter    Calcium    Ambulatory referral/consult to Endocrinology        Patient encouraged to reach out for further questions as needed prior to surgery

## 2025-05-12 NOTE — PATIENT INSTRUCTIONS
After the staples or sutures are removed apply Aquaphor to incision 2-3 times a day to keep the incision moist. Do this until the skin is fully healed, about 1-2 weeks.    Apply SPF 50+ sunscreen to the incision site to promote the best cosmetic result - sun will make the scar darker and more visible.    Two weeks following surgery, you can apply ScarAway or biocorneum to the incision site. You can cut this to size, removed it with bathing, wash it and reuse it. See instructions on package.  Remove ScarAway strip at night and apply Aquaphor and gently massage the wound.   If not ScarAway, you can use Mederma or other silicone strips designed for scar treatment. The brand does not matter.  You can do this indefinitely or until the scar is satisfactory.

## 2025-05-14 ENCOUNTER — OFFICE VISIT (OUTPATIENT)
Dept: HEMATOLOGY/ONCOLOGY | Facility: CLINIC | Age: 45
End: 2025-05-14
Payer: COMMERCIAL

## 2025-05-14 ENCOUNTER — RESULTS FOLLOW-UP (OUTPATIENT)
Dept: HEMATOLOGY/ONCOLOGY | Facility: CLINIC | Age: 45
End: 2025-05-14

## 2025-05-14 DIAGNOSIS — E83.51 HYPOCALCEMIA: Primary | ICD-10-CM

## 2025-05-14 DIAGNOSIS — C73 THYROID CANCER: ICD-10-CM

## 2025-05-14 DIAGNOSIS — C73 PAPILLARY THYROID CARCINOMA: ICD-10-CM

## 2025-05-14 PROCEDURE — 99024 POSTOP FOLLOW-UP VISIT: CPT | Mod: 95,,, | Performed by: NURSE PRACTITIONER

## 2025-05-14 NOTE — PROGRESS NOTES
Note to patients: In accordance with the  Century Cures Act, patients are now granted immediate electronic access to their medical records. This note is primarily intended for communication among medical professionals. As a result, it may incorporate medical terminology, abbreviations, or language that could appear blunt or unfamiliar. If you have questions about this document, we encourage you to discuss it with your physician.        [facility]  Virtual visit     The patient location is: home.  The chief complaint leading to consultation is: Head and neck cancer  Visit type: Virtual visit with synchronous audio and video  Total time spent with patient: 40 mins.  Each patient to whom he or she provides medical services by telemedicine is:  (1) informed of the relationship between the physician and patient and the respective role of any other health care provider with respect to management of the patient; and (2) notified that he or she may decline to receive medical services by telemedicine and may withdraw from such care at any time.      Patient: Ysabel Lua                  : 1980                          MRN: [id number]  Primary Care Provider: Almaz Bravo MD  Referring Provider/ENT: [place] Ingram MD   Date of Encounter: 10/01/2024     DIAGNOSIS: multinodular thyroid     CC:       Chief Complaint   Patient presents with    Thyroid Nodule         HPI:   Ysabel Lua is a 44 y.o. female who is being seen today for thyroid nodules. She was evaluated by her PCP who noticed an enlarged thyroid. She was referred to Dr. Ingram, who then referred her here. She has had both an ultrasound and an FNA.     Patient has been overall in her usual state of health. She has had a small decrease in her weight that was unintentional.     Patient has several family members with thyroid cancer. Her , who is with her today. has Graves and so they are familiar  with thyroid issues.    4/7/25: She has no changes since her last visit. She denies any growth in the nodule in her neck. She denies any fever, unintentional weight loss, change in voice, swallowing concerns.    4/21/25: Patient here to discuss surgery.    5/12/25:  She has had around 20cc in the last 24 hours. She is taking her tums 1000mg TID. She is taking her levothyroxine daily on an empty stomach. Overall she is doing well. She denies any pain today. She was having some pain in her perineum last night and thought she may have a hemorrhoid. She has not had any pain today.     5/14/25: Mr. Lua is here for a visit to review her path. When she got home from our last visit she realized she was taking 2,000IU TID instead of 1,0000. She has since just down to 1000 twice a day.      Patient lives in [place].    TREATMENT:   THYROIDECTOMY - total 5/7/25     ALLERGIES:       Review of patient's allergies indicates:   Allergen Reactions    Codeine                      MEDICATIONS:  Current Medications   No current outpatient medications on file.        PAST MEDICAL HISTORY:       Past Medical History:   Diagnosis Date    Fibroid, uterine 2006    Genetic testing 2017     neg                    PAST SURGICAL HISTORY:        Past Surgical History:   Procedure Laterality Date    APPENDECTOMY        MYOMECTOMY   2010                    FAMILY HISTORY:         Family History   Problem Relation Name Age of Onset    Colon cancer Maternal Grandfather        Thyroid cancer Maternal Grandfather        Breast cancer Paternal Grandmother        Ovarian cancer Paternal Grandmother             PATIENT GENETIC TEST NEG - 2017    Heart disease Paternal Grandfather        Prostate cancer Paternal Grandfather             SOCIAL HISTORY:  Social History            Socioeconomic History    Marital status:    Tobacco Use    Smoking status: Former    Smokeless tobacco: Never   Substance and Sexual Activity    Alcohol use: Yes        Alcohol/week: 1.0 standard drink of alcohol       Types: 1 Glasses of wine per week    Drug use: No    Sexual activity: Yes       Partners: Male       Birth control/protection: None      Social Drivers of Health           Financial Resource Strain: Unknown (7/27/2021)     [place] [facility] [place]     Overall Financial Resource Strain (CARDIA)      Difficulty of Paying Living Expenses: Patient declined   Food Insecurity: Unknown (7/27/2021)     [place] [facility] [place] [facility] [place]     Hunger Vital Sign      Worried About Running Out of Food in the Last Year: Patient declined      Ran Out of Food in the Last Year: Patient declined   Transportation Needs: Unknown (7/27/2021)     [place] [facility] [place] [facility] [place]     PRAPARE - Transportation      Lack of Transportation (Medical): Patient declined      Lack of Transportation (Non-Medical): Patient declined   Physical Activity: Unknown (7/27/2021)     [place] [facility] [place]     Exercise Vital Sign      Days of Exercise per Week: Patient declined      Minutes of Exercise per Session: Patient declined   Stress: Unknown (7/27/2021)     [place] [facility] [place] [facility] [place]     [employer] [place] [employer] - Occupational Stress Questionnaire      Feeling of Stress : Patient declined      See above substance history       PHYSICAL EXAMINATION:     Constitutional: Non-toxic appearing.   Psychiatric: Appropriate mood and affect. Cooperative.  Voice: Non-dysphonic, speaking in full sentences.      DATA REVIEWED:      LABORATORY:  TSH 2.56  T4 1.19     PATHOLOGY:  DIAGNOSIS:   05/13/2025 [facility]/kanwal     1. THYROID GLAND, LEFT LOBE, EXCISION:     --PAPILLARY THYROID CARCINOMA, WITH CLASSIC AND FOLLICULAR FEATURES AND    WITH CYSTIC      CHANGE.     --TUMOR IS MULTIFOCAL.     --LARGEST TUMOR FOCUS MEASURES  4.0 CENTIMETERS IN GREATEST DIMENSION.     --MARGINS ARE NEGATIVE FOR MALIGNANCY.     2. THYROID GLAND, RIGHT ISTHMUS, EXCISION:     --INFILTRATIVE PAPILLARY THYROID CARCINOMA, WITH CLASSIC AND FOLLICULAR    FEATURES.     --TUMOR MEASURES 1.2 CENTIMETERS IN GREATEST DIMENSION.     3. THYROID GLAND, RIGHT LOBE, EXCISION:     --PAPILLARY THYROID CARCINOMA, WITH CLASSIC AND FOLLICULAR FEATURES.     --TUMOR IS MULTIFOCAL.     --LARGEST TUMOR FOCUS MEASURES APPROXIMATELY 0.2 CENTIMETER IN GREATEST    DIMENSION.     --MARGINS ARE NEGATIVE FOR MALIGNANCY.     CASE SUMMARY FOR CARCINOMA OF THYROID GLAND:     PROCEDURE: Total thyroidectomy.   TUMOR FOCALITY: Multifocal.   TUMOR SITE: Left lobe; isthmus; right lobe.   TUMOR SIZE: 4.0 centimeters in greatest dimension (largest tumor focus    [left lobe]).     HISTOLOGIC TUMOR TYPES AND SUBTYPES: Papillary thyroid carcinoma, with    classic and follicular features and with cystic change.   ANGIOINVASION (VASCULAR INVASION): Not identified.   LYMPHATIC INVASION: Not identified.   EXTRATHYROIDAL EXTENSION: Not identified.   MARGIN STATUS: All margins negative for malignancy.   REGIONAL LYMPH NODE STATUS: Not applicable (no regional lymph nodes    submitted or found).   pTNM CLASSIFICATION (AJCC 8TH EDITION): pT2 (m)/pN not assigned (no    nodes submitted or found).   ADDITIONAL FINDINGS: Minimal-to-mild nodular hyperplasia.     Thyroid FNA 9/27/24  FINAL DIAGNOSIS:  Thyroid, left lower lobe, nodule (3.6 x 2.9 x 2.7 cm), fine-needle aspirate, cytospins and cellblock:  -Benign nodule, [place] II.      IMAGING:  US 9/9/24  FINDINGS: The right thyroid lobe measures 4.6 x 2.0 x 1.8 cm, and the left thyroid lobe measures 5.9 x 3.0 x 3.1 cm. The isthmus measures 0.43 cm in thickness.    Left thyroid nodule (image 430).  Composition: Mixed cystic and solid (1 point).  Echogenicity: Very hypoechoic (3 points).  Shape: Wider-than-tall (0 points).  Margin: Ill-defined (0  points).  Echogenic foci: None or large comet-tail artifacts (0 points).  Size: 3.6 x 2.7 x 2.9 cm  Change in size (greater than or equal to 20% increase in at least 2 nodular dimensions, with a minimal increase of 2 mm or greater than or equal to 50% or greater increase in volume): Not applicable.  TI-RADS: TR4: Moderately suspicious - Fine-needle aspiration if greater than or equal to 1.5 cm.    Isthmus nodule.  Composition: Solid or almost completely solid (2 points).  Echogenicity: Hypoechoic (2 points).  Shape: Wider-than-tall (0 points).  Margin: Smooth (0 points).  Echogenic foci: Macrocalcifications (1 point).  Size: 0.9 x 0.8 x 0.4 cm cm  Change in size (greater than or equal to 20% increase in at least 2 nodular dimensions, with a minimal increase of 2 mm or greater than or equal to 50% or greater increase in volume): Not applicable.  TI-RADS: TR4: Moderately suspicious - Fine-needle aspiration if greater than or equal to 1.5 cm; follow if greater than or equal to 1 cm at 1, 2, 3, and 5 years with ultrasound.    Left thyroid 0.5 cm hypoechoic nodule not requiring follow-up in size.    IMPRESSION:  1.  Left thyroid nodule. FNA recommended.  2.  Additional smaller thyroid nodules not requiring follow-up.     US Thyroid: 4/7/25  1. Interval increase in size of hypoechoic nodule with central calcifications in the right-sided the isthmus.  TI RADS grade of 4.  Considering the change in size, FNA recommended.  2.  Otherwise, stable multinodular goiter.     RADIOLOGY REVIEWED:  I have independently viewed and agree with the above images and reports which demonstrate large thyroid nodules on the left and small isthmus nodule.       ASSESSMENT AND PLAN:  1. Hypocalcemia    2. Thyroid cancer    3. Papillary thyroid carcinoma       Ysabel Lua is a 44 y.o. female with large left thyroid nodule as well as additional nodule in the thyroid isthmus.  - Path reviewed today   - Endo referral already placed on Monday   -  Aquaphor to the incision 2-3x a day  - TSH in 5 weeks if she does not have an appointment yet with endo at her next visit  - Continue calcium taper, will repeat calcium on Monday since she was on 2,000TID and dropped down to 1000 BID    Orders Placed This Encounter    CALCIUM   Patient encouraged to reach out for further questions as needed prior to surgery    Follow-up in 1 month or sooner with any concerns    Start time: 5/14/2025  9:03 AM CDT End time: 5/14/2025  9:18 AM CDT      I will START or STAY ON the medications listed below when I get home from the hospital:    acetaminophen 325 mg oral tablet  -- 2 tab(s) by mouth every 6 hours, As needed, Mild Pain   -- Indication: For Pain    traMADol 50 mg oral tablet  -- 1 tab(s) by mouth every 8 hours, As needed, Moderate Pain (4 - 6) MDD:3 tabs  -- Indication: For Pain    rivaroxaban 15 mg oral tablet  -- 1 tab(s) by mouth every 12 hours  -- Check with your doctor before becoming pregnant.  It is very important that you take or use this exactly as directed.  Do not skip doses or discontinue unless directed by your doctor.  Obtain medical advice before taking any non-prescription drugs as some may affect the action of this medication.  Take with food.    -- Indication: For Pulmonary embolism

## 2025-05-19 ENCOUNTER — LAB VISIT (OUTPATIENT)
Dept: LAB | Facility: HOSPITAL | Age: 45
End: 2025-05-19
Attending: NURSE PRACTITIONER
Payer: COMMERCIAL

## 2025-05-19 DIAGNOSIS — C73 THYROID CANCER: ICD-10-CM

## 2025-05-19 DIAGNOSIS — E83.51 HYPOCALCEMIA: ICD-10-CM

## 2025-05-19 LAB — CALCIUM SERPL-MCNC: 9.2 MG/DL (ref 8.7–10.5)

## 2025-05-19 PROCEDURE — 36415 COLL VENOUS BLD VENIPUNCTURE: CPT | Mod: PO

## 2025-05-19 PROCEDURE — 82310 ASSAY OF CALCIUM: CPT

## 2025-05-20 ENCOUNTER — RESULTS FOLLOW-UP (OUTPATIENT)
Dept: OTOLARYNGOLOGY | Facility: CLINIC | Age: 45
End: 2025-05-20
Payer: COMMERCIAL

## 2025-05-28 ENCOUNTER — TELEPHONE (OUTPATIENT)
Dept: FAMILY MEDICINE | Facility: CLINIC | Age: 45
End: 2025-05-28
Payer: COMMERCIAL

## 2025-05-28 NOTE — TELEPHONE ENCOUNTER
----- Message from Yolande sent at 5/28/2025  3:44 PM CDT -----  Type:  Patient Returning Call Who Called:Pt   Who Left Message for Patient:Cherise  Does the patient know what this is regarding?:NA Would the patient rather a call back or a response via [facility]? Call Back  Best Call Back Number:[phone] Additional Information: Pt returning call back from Dr Ray office  please leave message in portal     Please call Back to advise. Thanks!

## 2025-05-29 ENCOUNTER — OFFICE VISIT (OUTPATIENT)
Dept: ENDOCRINOLOGY | Facility: CLINIC | Age: 45
End: 2025-05-29
Payer: COMMERCIAL

## 2025-05-29 VITALS
OXYGEN SATURATION: 98 % | WEIGHT: 154.13 LBS | DIASTOLIC BLOOD PRESSURE: 78 MMHG | RESPIRATION RATE: 16 BRPM | SYSTOLIC BLOOD PRESSURE: 110 MMHG | BODY MASS INDEX: 25.68 KG/M2 | HEIGHT: 65 IN | HEART RATE: 75 BPM | TEMPERATURE: 98 F

## 2025-05-29 DIAGNOSIS — C73 THYROID CANCER: Primary | ICD-10-CM

## 2025-05-29 DIAGNOSIS — C73 PAPILLARY THYROID CARCINOMA: ICD-10-CM

## 2025-05-29 PROCEDURE — 99999 PR PBB SHADOW E&M-EST. PATIENT-LVL IV: CPT | Mod: PBBFAC,,, | Performed by: PHYSICIAN ASSISTANT

## 2025-05-29 PROCEDURE — 99204 OFFICE O/P NEW MOD 45 MIN: CPT | Mod: S$GLB,,, | Performed by: PHYSICIAN ASSISTANT

## 2025-05-29 NOTE — PROGRESS NOTES
CC: Papillary Thyroid Cancer    HPI: Ysabel Lua is a 45 y.o. female here for thyroid cancer along with pending conditions listed in the Visit Diagnosis. New to endocrine. Goiter found on exam with PCP in 8/24 at [place]. Thyroid u/s 9/24 showed a 3.6 cm left thyroid nodule. FNA 9/27 was benign. Repeat u/s 4/25 showed growth of an isthmus nodule from 0.9 cm >1.3 cm. S/p thyroidectomy 5/7/25 with Dr. Jin in ENT.  Diagnosed in 5/13/25. +FHx of thyroid cancer in her older brother, cousin and her gf had MEN. Father had a benign thyroid nodule removed. No hx of neck radiation. On mv, mg, probiotics. No perioral numbness.     On LT4 112 mcg daily.   +fatigue, hair loss, mental fog, palpitations.   No c/d, heat/cold intolerance, tremors.  Cycles are regular.    Thyroid Pathology  1. THYROID GLAND, LEFT LOBE, EXCISION:     --PAPILLARY THYROID CARCINOMA, WITH CLASSIC AND FOLLICULAR FEATURES AND    WITH CYSTIC      CHANGE.   --TUMOR IS MULTIFOCAL.   --LARGEST TUMOR FOCUS MEASURES 4.0 CENTIMETERS IN GREATEST DIMENSION.   --MARGINS ARE NEGATIVE FOR MALIGNANCY.   2. THYROID GLAND, RIGHT ISTHMUS, EXCISION:   --INFILTRATIVE PAPILLARY THYROID CARCINOMA, WITH CLASSIC AND FOLLICULAR    FEATURES.   --TUMOR MEASURES 1.2 CENTIMETERS IN GREATEST DIMENSION.   3. THYROID GLAND, RIGHT LOBE, EXCISION:   --PAPILLARY THYROID CARCINOMA, WITH CLASSIC AND FOLLICULAR FEATURES.   --TUMOR IS MULTIFOCAL.   --LARGEST TUMOR FOCUS MEASURES APPROXIMATELY 0.2 CENTIMETER IN GREATEST    DIMENSION.   --MARGINS ARE NEGATIVE FOR MALIGNANCY.     CASE SUMMARY FOR CARCINOMA OF THYROID GLAND:     PROCEDURE: Total thyroidectomy.   TUMOR FOCALITY: Multifocal.   TUMOR SITE: Left lobe; isthmus; right lobe.   TUMOR SIZE: 4.0 centimeters in greatest dimension (largest tumor focus    [left lobe]).     HISTOLOGIC TUMOR TYPES AND SUBTYPES: Papillary thyroid carcinoma, with    classic and follicular features and with cystic change.   ANGIOINVASION (VASCULAR  "INVASION): Not identified.   LYMPHATIC INVASION: Not identified.   EXTRATHYROIDAL EXTENSION: Not identified.   MARGIN STATUS: All margins negative for malignancy.   REGIONAL LYMPH NODE STATUS: Not applicable (no regional lymph nodes    submitted or found).   pTNM CLASSIFICATION (AJCC 8TH EDITION): pT2 (m)/pN not assigned (no    nodes submitted or found).   ADDITIONAL FINDINGS: Minimal-to-mild nodular hyperplasia.    PMHx, PSHx: reviewed in epic.  Social Hx: no ETOH/tobacco use.     Wt Readings from Last 10 Encounters:   05/29/25 69.9 kg (154 lb 1.6 oz)   05/12/25 68.4 kg (150 lb 12.7 oz)   05/07/25 72.1 kg (159 lb)   04/21/25 70.8 kg (156 lb 1.4 oz)   04/07/25 71.7 kg (158 lb 1.1 oz)   02/19/25 68.5 kg (151 lb)   11/26/24 70.5 kg (155 lb 6.8 oz)   08/15/23 71.7 kg (158 lb)   02/08/22 76.2 kg (168 lb)   02/18/19 78.5 kg (173 lb 1 oz)      ROS:   Constitutional: No recent significant weight change  Eyes: No recent visual changes  Cardiovascular: Denies current anginal symptoms  Respiratory: Denies current respiratory difficulty  Gastrointestinal: Denies recent bowel disturbances  GenitoUrinary - No dysuria  Skin: No new skin rash  Neurologic: No focal neurologic complaints  Musculoskeletal: no joint pain  Endocrine: no polyphagia, polydipsia or polyuria  Remainder ROS negative     /78 (BP Location: Left arm, Patient Position: Sitting)   Pulse 75   Temp 98.2 °F (36.8 °C) (Oral)   Resp 16   Ht 5' 5" (1.651 m)   Wt 69.9 kg (154 lb 1.6 oz)   LMP 04/20/2025 (Approximate)   SpO2 98%   BMI 25.64 kg/m²      Personally reviewed labs below:    No results found for: "TSH", "U3XLSGK", "C6EXSUV", "THYROIDAB", "FREET4"          Chemistry        Component Value Date/Time     04/25/2018 0917    K 4.8 04/25/2018 0917     04/25/2018 0917    CO2 24 04/25/2018 0917    BUN 11 04/25/2018 0917    CREATININE 0.8 04/25/2018 0917    GLU 94 04/25/2018 0917        Component Value Date/Time    CALCIUM 9.2 05/19/2025 1047 "    CALCIUM 8.8 05/08/2025 0755    ALKPHOS 31 (L) 04/25/2018 0917    AST 12 04/25/2018 0917    ALT 13 04/25/2018 0917    BILITOT 0.5 04/25/2018 0917    ESTGFRAFRICA >60.0 04/25/2018 0917    EGFRNONAA >60.0 04/25/2018 0917           Lab Results   Component Value Date    HGBA1C 5.3 09/16/2020      PE:  GENERAL: Well developed, well nourished  NECK: Supple neck, normal thyroid. No bruit  LYMPHATIC: No cervical or supraclavicular lymphadenopathy  CARDIOVASCULAR: Normal heart sounds, no pedal edema  RESPIRATORY: Normal effort, clear to auscultation  MUSC: 2+ DTR UE/LE  NEURO: steady gait, CN ll-Xll grossly intact  PSYCH: normal mood and affect    U/s 9/24  FINDINGS: The right thyroid lobe measures 4.6 x 2.0 x 1.8 cm, and the left thyroid lobe measures 5.9 x 3.0 x 3.1 cm. The isthmus measures 0.43 cm in thickness.     Left thyroid nodule (image 430).   Composition: Mixed cystic and solid (1 point).   Echogenicity: Very hypoechoic (3 points).   Shape: Wider-than-tall (0 points).   Margin: Ill-defined (0 points).   Echogenic foci: None or large comet-tail artifacts (0 points).   Size: 3.6 x 2.7 x 2.9 cm   Change in size (greater than or equal to 20% increase in at least 2 nodular dimensions, with a minimal increase of 2 mm or greater than or equal to 50% or greater increase in volume): Not applicable.   TI-RADS: TR4: Moderately suspicious - Fine-needle aspiration if greater than or equal to 1.5 cm.     Isthmus nodule.   Composition: Solid or almost completely solid (2 points).   Echogenicity: Hypoechoic (2 points).   Shape: Wider-than-tall (0 points).   Margin: Smooth (0 points).   Echogenic foci: Macrocalcifications (1 point).   Size: 0.9 x 0.8 x 0.4 cm cm   Change in size (greater than or equal to 20% increase in at least 2 nodular dimensions, with a minimal increase of 2 mm or greater than or equal to 50% or greater increase in volume): Not applicable.   TI-RADS: TR4: Moderately suspicious - Fine-needle aspiration if  greater than or equal to 1.5 cm; follow if greater than or equal to 1 cm at 1, 2, 3, and 5 years with ultrasound.     Left thyroid 0.5 cm hypoechoic nodule not requiring follow-up in size.     IMPRESSION:    1.  Left thyroid nodule. FNA recommended.    2.  Additional smaller thyroid nodules not requiring follow-up.     Assessment/Plan:   1. Thyroid cancer  TSH    T4, Free    Thyroglobulin    TSH    T4, Free    Thyroglobulin      2. Papillary thyroid carcinoma           PTC  4 cm left, 1.2 cm in isthmus  >1 cm. Referral for [facility].   Fhx of thyroid cancer.  Obtain wbs ~3 mths after [facility].   Repeat u/s for 10 years.    Hypothyroidism-  Continue LT4 112 mcg qd.  Recheck in two weeks.    FOLLOWUP  TFTs, Tg, calcitonin in two weeks  F/u in 4 mths-tfts, Tg

## 2025-06-06 ENCOUNTER — PATIENT MESSAGE (OUTPATIENT)
Dept: ENDOCRINOLOGY | Facility: CLINIC | Age: 45
End: 2025-06-06
Payer: COMMERCIAL

## 2025-06-11 ENCOUNTER — OFFICE VISIT (OUTPATIENT)
Dept: HEMATOLOGY/ONCOLOGY | Facility: CLINIC | Age: 45
End: 2025-06-11
Payer: COMMERCIAL

## 2025-06-11 VITALS
SYSTOLIC BLOOD PRESSURE: 100 MMHG | OXYGEN SATURATION: 99 % | HEART RATE: 70 BPM | RESPIRATION RATE: 16 BRPM | TEMPERATURE: 98 F | HEIGHT: 65 IN | WEIGHT: 155.88 LBS | DIASTOLIC BLOOD PRESSURE: 67 MMHG | BODY MASS INDEX: 25.97 KG/M2

## 2025-06-11 DIAGNOSIS — C73 PAPILLARY THYROID CARCINOMA: Primary | ICD-10-CM

## 2025-06-11 PROCEDURE — 99024 POSTOP FOLLOW-UP VISIT: CPT | Mod: S$GLB,,, | Performed by: NURSE PRACTITIONER

## 2025-06-11 PROCEDURE — 99999 PR PBB SHADOW E&M-EST. PATIENT-LVL III: CPT | Mod: PBBFAC,,, | Performed by: NURSE PRACTITIONER

## 2025-06-11 NOTE — PROGRESS NOTES
Note to patients: In accordance with the  Century Cures Act, patients are now granted immediate electronic access to their medical records. This note is primarily intended for communication among medical professionals. As a result, it may incorporate medical terminology, abbreviations, or language that could appear blunt or unfamiliar. If you have questions about this document, we encourage you to discuss it with your physician.        [facility]     Patient: Ysabel Lua                  : 1980                          MRN: [id number]  Primary Care Provider: Almaz Bravo MD  Referring Provider/ENT: [place] Ingram MD   Date of Encounter: 10/01/2024     DIAGNOSIS: multinodular thyroid     CC:       Chief Complaint   Patient presents with    Thyroid Nodule         HPI:   Ysabel Lua is a 44 y.o. female who is being seen today for thyroid nodules. She was evaluated by her PCP who noticed an enlarged thyroid. She was referred to Dr. Ingram, who then referred her here. She has had both an ultrasound and an FNA.     Patient has been overall in her usual state of health. She has had a small decrease in her weight that was unintentional.     Patient has several family members with thyroid cancer. Her , who is with her today. has Graves and so they are familiar with thyroid issues.    25: She has no changes since her last visit. She denies any growth in the nodule in her neck. She denies any fever, unintentional weight loss, change in voice, swallowing concerns.    25: Patient here to discuss surgery.    25:  She has had around 20cc in the last 24 hours. She is taking her tums 1000mg TID. She is taking her levothyroxine daily on an empty stomach. Overall she is doing well. She denies any pain today. She was having some pain in her perineum last night and thought she may have a hemorrhoid. She has not had any pain today.      25:   "Stoney is here for a visit to review her path. When she got home from our last visit she realized she was taking 2,000IU TID instead of 1,0000. She has since just down to 1000 twice a day.   Patient lives in [place].    6/11/25: Ms. Lua presents today for follow-up after thyroid surgery with complaints of fatigue She reports fatigue and increased appetite described as "excessive" since thyroidectomy 5 weeks ago. She experiences intermittent sensation of throat fullness and changes in her singing voice. She gets winded when talking but not during exercise. She previously experienced red lines on her skin when exposed to heat or outdoors, which has resolved in the past two weeks. She did see endo and will have labs tomorrow. She is also working to get scheduled fro [place] with Dr. Rabago. She is taking her Synthroid daily on an empty stomach.       ROS:  ROS as indicated in HPI.          TREATMENT:   THYROIDECTOMY - total 5/7/25     ALLERGIES:       Review of patient's allergies indicates:   Allergen Reactions    Codeine                      MEDICATIONS:  Current Medications   No current outpatient medications on file.        PAST MEDICAL HISTORY:       Past Medical History:   Diagnosis Date    Fibroid, uterine 2006    Genetic testing 2017     neg                    PAST SURGICAL HISTORY:        Past Surgical History:   Procedure Laterality Date    APPENDECTOMY        MYOMECTOMY   2010                    FAMILY HISTORY:         Family History   Problem Relation Name Age of Onset    Colon cancer Maternal Grandfather        Thyroid cancer Maternal Grandfather        Breast cancer Paternal Grandmother        Ovarian cancer Paternal Grandmother             PATIENT GENETIC TEST NEG - 2017    Heart disease Paternal Grandfather        Prostate cancer Paternal Grandfather             SOCIAL HISTORY:  Social History            Socioeconomic History    Marital status:    Tobacco Use    Smoking status: Former    " Smokeless tobacco: Never   Substance and Sexual Activity    Alcohol use: Yes       Alcohol/week: 1.0 standard drink of alcohol       Types: 1 Glasses of wine per week    Drug use: No    Sexual activity: Yes       Partners: Male       Birth control/protection: None      Social Drivers of Health           Financial Resource Strain: Unknown (7/27/2021)     [place] [facility]     Overall Financial Resource Strain (CARDIA)      Difficulty of Paying Living Expenses: Patient declined   Food Insecurity: Unknown (7/27/2021)     [place] [facility]     Hunger Vital Sign      Worried About Running Out of Food in the Last Year: Patient declined      Ran Out of Food in the Last Year: Patient declined   Transportation Needs: Unknown (7/27/2021)     [place] [facility]     PRAPARE - Transportation      Lack of Transportation (Medical): Patient declined      Lack of Transportation (Non-Medical): Patient declined   Physical Activity: Unknown (7/27/2021)     [place] [facility]     Exercise Vital Sign      Days of Exercise per Week: Patient declined      Minutes of Exercise per Session: Patient declined   Stress: Unknown (7/27/2021)     [place] [facility]     [employer] [place] [employer] - Occupational Stress Questionnaire      Feeling of Stress : Patient declined      See above substance history       PHYSICAL EXAMINATION:     Constitutional: Non-toxic appearing.   Psychiatric: Appropriate mood and affect. Cooperative.  Voice: Non-dysphonic, speaking in full sentences.   Neurologic: Cranial nerves grossly intact, no focal deficits.  Head and face: Salivary glands are not enlarged. Face is symmetric. CN VII strength intact.  Skin: Incision healed  Eyes: Vision grossly intact, bilateral extraocular  movements intact  Ears: Bilateral pinna, mastoid, external ear canal normal. Hearing intact.   Nose: External nose appears normal.   Lips: No ulcers or lesions  Neck: scar, no lymphadenopathy or mass felt  Respiratory: Chest expansion symmetric, no audible stridor or stertor. Breathing is unlabored. No active cough.     DATA REVIEWED:      LABORATORY:  TSH 2.56  T4 1.19     PATHOLOGY:  Thyroid FNA 9/27/24  FINAL DIAGNOSIS:  Thyroid, left lower lobe, nodule (3.6 x 2.9 x 2.7 cm), fine-needle aspirate, cytospins and cellblock:  -Benign nodule, Sharon II.      IMAGING:  US 9/9/24  FINDINGS: The right thyroid lobe measures 4.6 x 2.0 x 1.8 cm, and the left thyroid lobe measures 5.9 x 3.0 x 3.1 cm. The isthmus measures 0.43 cm in thickness.    Left thyroid nodule (image 430).  Composition: Mixed cystic and solid (1 point).  Echogenicity: Very hypoechoic (3 points).  Shape: Wider-than-tall (0 points).  Margin: Ill-defined (0 points).  Echogenic foci: None or large comet-tail artifacts (0 points).  Size: 3.6 x 2.7 x 2.9 cm  Change in size (greater than or equal to 20% increase in at least 2 nodular dimensions, with a minimal increase of 2 mm or greater than or equal to 50% or greater increase in volume): Not applicable.  TI-RADS: TR4: Moderately suspicious - Fine-needle aspiration if greater than or equal to 1.5 cm.    Isthmus nodule.  Composition: Solid or almost completely solid (2 points).  Echogenicity: Hypoechoic (2 points).  Shape: Wider-than-tall (0 points).  Margin: Smooth (0 points).  Echogenic foci: Macrocalcifications (1 point).  Size: 0.9 x 0.8 x 0.4 cm cm  Change in size (greater than or equal to 20% increase in at least 2 nodular dimensions, with a minimal increase of 2 mm or greater than or equal to 50% or greater increase in volume): Not applicable.  TI-RADS: TR4: Moderately suspicious - Fine-needle aspiration if greater than or equal to 1.5 cm; follow if greater than or equal to 1 cm at 1, 2, 3, and 5  years with ultrasound.    Left thyroid 0.5 cm hypoechoic nodule not requiring follow-up in size.    IMPRESSION:  1.  Left thyroid nodule. FNA recommended.  2.  Additional smaller thyroid nodules not requiring follow-up.     US Thyroid: 4/7/25  1. Interval increase in size of hypoechoic nodule with central calcifications in the right-sided the isthmus.  TI RADS grade of 4.  Considering the change in size, FNA recommended.  2.  Otherwise, stable multinodular goiter.     RADIOLOGY REVIEWED:  I have independently viewed and agree with the above images and reports which demonstrate large thyroid nodules on the left and small isthmus nodule.     ASSESSMENT AND PLAN:  1. Papillary thyroid carcinoma       Ysabel Lua is a 44 y.o. female S/P total thyroidectomy-   - Endo appointment with labs in September already scheduled  - Continue levothyroxine  - Check TSH next week  - Is scheduling [facility] with Dr. Rabago      Patient encouraged to reach out for further questions as needed prior to surgery    Follow-up in 3 months

## 2025-06-12 ENCOUNTER — LAB VISIT (OUTPATIENT)
Dept: LAB | Facility: HOSPITAL | Age: 45
End: 2025-06-12
Attending: PHYSICIAN ASSISTANT
Payer: COMMERCIAL

## 2025-06-12 DIAGNOSIS — C73 THYROID CANCER: ICD-10-CM

## 2025-06-12 LAB
T4 FREE SERPL-MCNC: 1.24 NG/DL (ref 0.71–1.51)
TSH SERPL-ACNC: 8.71 UIU/ML (ref 0.4–4)

## 2025-06-12 PROCEDURE — 86800 THYROGLOBULIN ANTIBODY: CPT

## 2025-06-12 PROCEDURE — 84443 ASSAY THYROID STIM HORMONE: CPT

## 2025-06-12 PROCEDURE — 82308 ASSAY OF CALCITONIN: CPT

## 2025-06-12 PROCEDURE — 84439 ASSAY OF FREE THYROXINE: CPT

## 2025-06-12 PROCEDURE — 36415 COLL VENOUS BLD VENIPUNCTURE: CPT | Mod: PO

## 2025-06-14 LAB
CALCIT SERPL-MCNC: <5 PG/ML
ENDOCRINOLOGIST REVIEW: ABNORMAL
THYROGLOB AB SERPL IA-ACNC: 1.8 IU/ML
THYROGLOB SERPL-MCNC: <0.1 NG/ML

## 2025-06-20 ENCOUNTER — PATIENT MESSAGE (OUTPATIENT)
Dept: ENDOCRINOLOGY | Facility: CLINIC | Age: 45
End: 2025-06-20
Payer: COMMERCIAL

## 2025-06-20 ENCOUNTER — RESULTS FOLLOW-UP (OUTPATIENT)
Dept: ENDOCRINOLOGY | Facility: CLINIC | Age: 45
End: 2025-06-20

## 2025-06-20 DIAGNOSIS — E03.9 HYPOTHYROIDISM, UNSPECIFIED TYPE: Primary | ICD-10-CM

## 2025-06-20 RX ORDER — LEVOTHYROXINE SODIUM 125 UG/1
125 TABLET ORAL
Qty: 30 TABLET | Refills: 11 | Status: SHIPPED | OUTPATIENT
Start: 2025-06-20 | End: 2025-08-03

## 2025-06-23 ENCOUNTER — TELEPHONE (OUTPATIENT)
Dept: ENDOCRINOLOGY | Facility: CLINIC | Age: 45
End: 2025-06-23

## 2025-06-23 ENCOUNTER — OFFICE VISIT (OUTPATIENT)
Dept: ENDOCRINOLOGY | Facility: CLINIC | Age: 45
End: 2025-06-23
Payer: COMMERCIAL

## 2025-06-23 ENCOUNTER — HOSPITAL ENCOUNTER (OUTPATIENT)
Dept: ENDOCRINOLOGY | Facility: CLINIC | Age: 45
Discharge: HOME | End: 2025-06-23
Attending: INTERNAL MEDICINE
Payer: COMMERCIAL

## 2025-06-23 ENCOUNTER — RESULTS FOLLOW-UP (OUTPATIENT)
Dept: ENDOCRINOLOGY | Facility: CLINIC | Age: 45
End: 2025-06-23

## 2025-06-23 VITALS
BODY MASS INDEX: 26.67 KG/M2 | SYSTOLIC BLOOD PRESSURE: 105 MMHG | DIASTOLIC BLOOD PRESSURE: 69 MMHG | HEART RATE: 71 BPM | WEIGHT: 160.25 LBS

## 2025-06-23 DIAGNOSIS — E89.0 POSTOPERATIVE HYPOTHYROIDISM: ICD-10-CM

## 2025-06-23 DIAGNOSIS — C73 THYROID CANCER: Primary | ICD-10-CM

## 2025-06-23 DIAGNOSIS — C73 PAPILLARY THYROID CARCINOMA: ICD-10-CM

## 2025-06-23 DIAGNOSIS — C73 THYROID CANCER: ICD-10-CM

## 2025-06-23 PROBLEM — E04.1 THYROID NODULE: Status: RESOLVED | Noted: 2025-05-07 | Resolved: 2025-06-23

## 2025-06-23 PROCEDURE — 76536 US EXAM OF HEAD AND NECK: CPT | Mod: S$GLB,,, | Performed by: INTERNAL MEDICINE

## 2025-06-23 PROCEDURE — G2211 COMPLEX E/M VISIT ADD ON: HCPCS | Mod: S$GLB,,, | Performed by: INTERNAL MEDICINE

## 2025-06-23 PROCEDURE — 99214 OFFICE O/P EST MOD 30 MIN: CPT | Mod: S$GLB,,, | Performed by: INTERNAL MEDICINE

## 2025-06-23 PROCEDURE — 99999 PR PBB SHADOW E&M-EST. PATIENT-LVL III: CPT | Mod: PBBFAC,,, | Performed by: INTERNAL MEDICINE

## 2025-06-23 RX ORDER — ONDANSETRON 4 MG/1
4 TABLET, ORALLY DISINTEGRATING ORAL EVERY 6 HOURS PRN
Qty: 20 TABLET | Refills: 0 | Status: SHIPPED | OUTPATIENT
Start: 2025-06-23

## 2025-06-23 NOTE — PROGRESS NOTES
FOLLOW-UP VISIT    Subjective:      Chief Complaint: Follow-up visit for thyroid cancer and post-surgical hypothyroidism    HPI: Ysabel Lua is a 45 y.o. female with    Past Medical History:   Diagnosis Date    Fibroid, uterine 2006    Genetic testing 2017    neg    Thyroid nodule        Patient is new to me and last seen by Blanca Ray on 25.      With regards to thyroid cancer:    Pt is here for follow up of differentiated thyroid cancer and to discuss treatment with I-131.    Thyroid cancer diagnosed 2025  Total thyroidectomy 2025 with Dr. Jin  Pathology:        PASCUAL Risk assessment: Low    Last T.8 with TSH 8.7 (6 weeks post-op)  Last U/S: 25 - CHELSY    Prior I-131:  None     Lab Results   Component Value Date    TSH 8.706 (H) 2025    FREET4 1.24 2025     LT4 dose increased from 112 > 125 mcg daily on . Repeat labs scheduled 25.          Objective:     Vitals:    25 0919   BP: 105/69   Pulse: 71         BP Readings from Last 5 Encounters:   25 105/69   25 100/67   25 110/78   25 106/77   25 126/72         Physical Exam  Vitals and nursing note reviewed.   Constitutional:       General: She is not in acute distress.     Appearance: She is well-developed. She is not ill-appearing.   HENT:      Head: Normocephalic and atraumatic.   Neck:      Thyroid: No thyromegaly (scar healing well).      Trachea: No tracheal deviation.   Pulmonary:      Effort: Pulmonary effort is normal. No respiratory distress.   Neurological:      Mental Status: She is alert and oriented to person, place, and time.      Coordination: Coordination normal.   Psychiatric:         Mood and Affect: Mood normal.         Behavior: Behavior normal.           Wt Readings from Last 3 Encounters:   25 0919 72.7 kg (160 lb 4.4 oz)   25 0909 70.7 kg (155 lb 13.8 oz)   25 1245 69.9 kg (154 lb 1.6 oz)         Assessment/Plan:       Papillary thyroid  carcinoma    I-131:    Informed consent for treatment was obtained verbally.  The indication, risks and benefits and alternatives to treatment with radioactive iodine were reviewed with the patient.  Specifically, the risks for nausea and vomiting, temporary or permanent injury to the salivary glands, and secondary malignancies such as leukemia were discussed.  All questions were answered, and the patient would like to proceed with treatment.     The importance of preparation with the low iodine diet was reviewed, and the principles of radiation safety precaution were also reviewed.      The patient has no special medical needs. Home and travel situations are appropriate for outpatient treatment.     Given the status/extent of the disease, an administered activity of 75 mCi of I-131 would be appropriate.  Written radiation safety precautions will be provided, and the informed consent form can be signed on the date of treatment.     A working schedule is as follows:    7/2 (Wednesday):  Begin low iodine diet  7/14 (Monday):  Receive 1st Thyrogen injection at [facility] ([facility], 6th floor clinic [place])  7/15 (Tuesday):  Receive 2nd Thyrogen injection at [facility] followed by labs  7/16 (Wednesday):   Treatment with 75 mCi I-131 at [facility]  7/19 (Saturday):   Resume usual diet  7/23 (Wednesday):   Undergo post-treatment whole body scan at Nuclear Medicine      Checklist:  [x]    Written Directive - to be completed when patient arrives for treatment  [x]    Screening worksheet for outpatient treatment reviewed and signed  [x]    Patient radiation safety instructions provided  [x]    Record of patient release (<220 mCi for thyroidectomy patients) - to be completed when patient arrives for treatment  [x]    Informed consent - to be signed when patient arrives for treatment  [x]    Pregnancy and breast feeding screen (order serum hCG within 48 hours of treatment if female <55 years  old and without hysterectomy)  [x]    Low iodine diet information provided

## 2025-06-23 NOTE — ASSESSMENT & PLAN NOTE
I-131:    Informed consent for treatment was obtained verbally.  The indication, risks and benefits and alternatives to treatment with radioactive iodine were reviewed with the patient.  Specifically, the risks for nausea and vomiting, temporary or permanent injury to the salivary glands, and secondary malignancies such as leukemia were discussed.  All questions were answered, and the patient would like to proceed with treatment.     The importance of preparation with the low iodine diet was reviewed, and the principles of radiation safety precaution were also reviewed.      The patient has no special medical needs. Home and travel situations are appropriate for outpatient treatment.     Given the status/extent of the disease, an administered activity of 75 mCi of I-131 would be appropriate.  Written radiation safety precautions will be provided, and the informed consent form can be signed on the date of treatment.     A working schedule is as follows:    7/2 (Wednesday):  Begin low iodine diet  7/14 (Monday):  Receive 1st Thyrogen injection at [facility] ([facility], 6th floor clinic [place])  7/15 (Tuesday):  Receive 2nd Thyrogen injection at [facility] followed by labs  7/16 (Wednesday):   Treatment with 75 mCi I-131 at [facility]  7/19 (Saturday):   Resume usual diet  7/23 (Wednesday):   Undergo post-treatment whole body scan at Nuclear Medicine      Checklist:  [x]    Written Directive - to be completed when patient arrives for treatment  [x]    Screening worksheet for outpatient treatment reviewed and signed  [x]    Patient radiation safety instructions provided  [x]    Record of patient release (<220 mCi for thyroidectomy patients) - to be completed when patient arrives for treatment  [x]    Informed consent - to be signed when patient arrives for treatment  [x]    Pregnancy and breast feeding screen (order serum hCG within 48 hours of treatment if female <55 years old and  without hysterectomy)  [x]    Low iodine diet information provided

## 2025-06-23 NOTE — TELEPHONE ENCOUNTER
7/2 (Wednesday):  Begin low iodine diet  7/14 (Monday):  Receive 1st Thyrogen injection at Endocrine clinic in [place]  7/15 (Tuesday):  Receive 2nd Thyrogen injection at Endocrine clinic in [place] followed by labs in [place]  7/16 (Wednesday):   Treatment with 75 mCi I-131 at [facility]   7/23 (Wednesday):   Undergo post-treatment whole body scan at Nuclear Medicine

## 2025-07-14 ENCOUNTER — CLINICAL SUPPORT (OUTPATIENT)
Dept: ENDOCRINOLOGY | Facility: CLINIC | Age: 45
End: 2025-07-14
Payer: COMMERCIAL

## 2025-07-14 ENCOUNTER — PATIENT MESSAGE (OUTPATIENT)
Dept: ENDOCRINOLOGY | Facility: CLINIC | Age: 45
End: 2025-07-14
Payer: COMMERCIAL

## 2025-07-14 DIAGNOSIS — C73 PAPILLARY THYROID CARCINOMA: Primary | ICD-10-CM

## 2025-07-14 PROCEDURE — 96372 THER/PROPH/DIAG INJ SC/IM: CPT | Mod: S$GLB,,, | Performed by: INTERNAL MEDICINE

## 2025-07-15 ENCOUNTER — CLINICAL SUPPORT (OUTPATIENT)
Dept: ENDOCRINOLOGY | Facility: CLINIC | Age: 45
End: 2025-07-15
Payer: COMMERCIAL

## 2025-07-15 ENCOUNTER — LAB VISIT (OUTPATIENT)
Dept: LAB | Facility: HOSPITAL | Age: 45
End: 2025-07-15
Attending: INTERNAL MEDICINE
Payer: COMMERCIAL

## 2025-07-15 DIAGNOSIS — C73 THYROID CANCER: ICD-10-CM

## 2025-07-15 DIAGNOSIS — C73 PAPILLARY THYROID CARCINOMA: Primary | ICD-10-CM

## 2025-07-15 LAB
HCG INTACT+B SERPL-ACNC: <2.4 MIU/ML
T4 FREE SERPL-MCNC: 1.35 NG/DL (ref 0.71–1.51)
TSH SERPL-ACNC: 107.55 UIU/ML (ref 0.4–4)

## 2025-07-15 PROCEDURE — 84439 ASSAY OF FREE THYROXINE: CPT

## 2025-07-15 PROCEDURE — 86800 THYROGLOBULIN ANTIBODY: CPT

## 2025-07-15 PROCEDURE — 36415 COLL VENOUS BLD VENIPUNCTURE: CPT | Mod: PO

## 2025-07-15 PROCEDURE — 84702 CHORIONIC GONADOTROPIN TEST: CPT

## 2025-07-15 PROCEDURE — 96372 THER/PROPH/DIAG INJ SC/IM: CPT | Mod: S$GLB,,, | Performed by: INTERNAL MEDICINE

## 2025-07-15 PROCEDURE — 84443 ASSAY THYROID STIM HORMONE: CPT

## 2025-07-15 NOTE — PROGRESS NOTES
Administered Thyrogen ( 2nd dose ) to patient to Left Deltoid, Pt tolerated well.  Pt to have labs drawn after injection.

## 2025-07-16 ENCOUNTER — HOSPITAL ENCOUNTER (OUTPATIENT)
Dept: RADIOLOGY | Facility: HOSPITAL | Age: 45
Discharge: HOME | End: 2025-07-16
Attending: INTERNAL MEDICINE
Payer: COMMERCIAL

## 2025-07-16 DIAGNOSIS — C73 THYROID CANCER: ICD-10-CM

## 2025-07-16 PROCEDURE — 79005 NUCLEAR RX ORAL ADMIN: CPT | Mod: TC

## 2025-07-16 PROCEDURE — 79005 NUCLEAR RX ORAL ADMIN: CPT | Mod: 26,,, | Performed by: NUCLEAR MEDICINE

## 2025-07-16 PROCEDURE — A9530 I131 IODIDE SOL, RX: HCPCS | Mod: TB | Performed by: INTERNAL MEDICINE

## 2025-07-16 RX ADMIN — SODIUM IODIDE I 131 76.3 MILLICURIE: 1 SOLUTION ORAL at 01:07

## 2025-07-17 LAB
ENDOCRINOLOGIST REVIEW: NORMAL
THYROGLOB AB SERPL IA-ACNC: <1.8 IU/ML
THYROGLOB SERPL-MCNC: <0.1 NG/ML

## 2025-07-23 ENCOUNTER — HOSPITAL ENCOUNTER (OUTPATIENT)
Dept: RADIOLOGY | Facility: HOSPITAL | Age: 45
Discharge: HOME | End: 2025-07-23
Attending: INTERNAL MEDICINE
Payer: COMMERCIAL

## 2025-07-23 DIAGNOSIS — C73 THYROID CANCER: ICD-10-CM

## 2025-07-23 PROCEDURE — 78018 THYROID MET IMAGING BODY: CPT | Mod: 26,,, | Performed by: NUCLEAR MEDICINE

## 2025-07-23 PROCEDURE — 78018 THYROID MET IMAGING BODY: CPT | Mod: TC

## 2025-07-29 ENCOUNTER — PATIENT MESSAGE (OUTPATIENT)
Dept: ENDOCRINOLOGY | Facility: CLINIC | Age: 45
End: 2025-07-29
Payer: COMMERCIAL

## 2025-08-01 ENCOUNTER — LAB VISIT (OUTPATIENT)
Dept: LAB | Facility: HOSPITAL | Age: 45
End: 2025-08-01
Attending: PHYSICIAN ASSISTANT
Payer: COMMERCIAL

## 2025-08-01 DIAGNOSIS — E03.9 HYPOTHYROIDISM, UNSPECIFIED TYPE: ICD-10-CM

## 2025-08-01 LAB
T4 FREE SERPL-MCNC: 1.45 NG/DL (ref 0.71–1.51)
TSH SERPL-ACNC: 0.84 UIU/ML (ref 0.4–4)

## 2025-08-01 PROCEDURE — 84439 ASSAY OF FREE THYROXINE: CPT

## 2025-08-01 PROCEDURE — 36415 COLL VENOUS BLD VENIPUNCTURE: CPT | Mod: PO

## 2025-08-01 PROCEDURE — 84443 ASSAY THYROID STIM HORMONE: CPT

## 2025-08-03 ENCOUNTER — RESULTS FOLLOW-UP (OUTPATIENT)
Dept: ENDOCRINOLOGY | Facility: CLINIC | Age: 45
End: 2025-08-03
Payer: COMMERCIAL

## 2025-08-03 ENCOUNTER — PATIENT MESSAGE (OUTPATIENT)
Dept: ENDOCRINOLOGY | Facility: CLINIC | Age: 45
End: 2025-08-03
Payer: COMMERCIAL

## 2025-08-03 DIAGNOSIS — E03.9 HYPOTHYROIDISM, UNSPECIFIED TYPE: ICD-10-CM

## 2025-08-03 DIAGNOSIS — E89.0 POSTOPERATIVE HYPOTHYROIDISM: ICD-10-CM

## 2025-08-03 DIAGNOSIS — C73 THYROID CANCER: Primary | ICD-10-CM

## 2025-08-03 RX ORDER — LEVOTHYROXINE SODIUM 125 UG/1
125 TABLET ORAL
Qty: 30 TABLET | Refills: 11 | Status: SHIPPED | OUTPATIENT
Start: 2025-08-03 | End: 2026-08-03